# Patient Record
Sex: FEMALE | Race: WHITE | URBAN - METROPOLITAN AREA
[De-identification: names, ages, dates, MRNs, and addresses within clinical notes are randomized per-mention and may not be internally consistent; named-entity substitution may affect disease eponyms.]

---

## 2023-06-27 ENCOUNTER — OFFICE VISIT (OUTPATIENT)
Dept: URBAN - METROPOLITAN AREA CLINIC 34 | Facility: CLINIC | Age: 53
End: 2023-06-27
Payer: COMMERCIAL

## 2023-06-27 DIAGNOSIS — Z41.1 ENCOUNTER FOR COSMETIC SURGERY: ICD-10-CM

## 2023-06-27 DIAGNOSIS — H04.203 UNSPECIFIED EPIPHORA, BILATERAL: Primary | ICD-10-CM

## 2023-06-27 PROCEDURE — 99202 OFFICE O/P NEW SF 15 MIN: CPT | Performed by: OPHTHALMOLOGY

## 2023-06-27 RX ORDER — PREDNISONE 20 MG/1
20 MG TABLET ORAL
Qty: 0 | Refills: 0 | Status: ACTIVE
Start: 2023-06-27

## 2023-06-27 RX ORDER — LEVOTHYROXINE, LIOTHYRONINE 9.5; 2.25 UG/1; UG/1
15 MG TABLET ORAL
Qty: 0 | Refills: 0 | Status: ACTIVE
Start: 2023-06-27

## 2023-06-27 RX ORDER — MECOBALAMIN 1000 MCG
TABLET,CHEWABLE ORAL
Qty: 0 | Refills: 0 | Status: ACTIVE
Start: 2023-06-27

## 2023-06-27 NOTE — IMPRESSION/PLAN
Impression: Encounter for cosmetic surgery: Z41.1. Plan: likely retained filler. Injected 5 years ago; cannot recall name of filler injected. June 7, patient noticed right sided swelling which migrated to the left. She noted hard knots at the anterior cheek area. Swelling spread to lateral cheek as well. started keflex 1-2 weeks after onset. No improvement. Started 40mg qday with her PCP which led to significant improvement. Recommended that she undergo taper. Offered to dissolve filler with hyaluronidase but she would like to think about next steps. She has an appt scheduled in August; she knows to call if worsening. All questions answered.

## 2023-06-27 NOTE — IMPRESSION/PLAN
Impression: Unspecified epiphora, bilateral: H04.203. Plan: Irrigated BLL puncta, Right puncta Reflux 0%, NL Outflow 100%, Left puncta Reflux 20%, NL Outflow 100%. See plan.